# Patient Record
Sex: MALE | Race: WHITE | Employment: UNEMPLOYED | URBAN - METROPOLITAN AREA
[De-identification: names, ages, dates, MRNs, and addresses within clinical notes are randomized per-mention and may not be internally consistent; named-entity substitution may affect disease eponyms.]

---

## 2023-05-12 ENCOUNTER — APPOINTMENT (OUTPATIENT)
Dept: GENERAL RADIOLOGY | Age: 43
End: 2023-05-12
Attending: EMERGENCY MEDICINE
Payer: MEDICAID

## 2023-05-12 ENCOUNTER — HOSPITAL ENCOUNTER (EMERGENCY)
Age: 43
Discharge: HOME | End: 2023-05-12
Attending: EMERGENCY MEDICINE
Payer: MEDICAID

## 2023-05-12 VITALS
SYSTOLIC BLOOD PRESSURE: 134 MMHG | HEIGHT: 67 IN | HEART RATE: 91 BPM | BODY MASS INDEX: 32.96 KG/M2 | OXYGEN SATURATION: 98 % | WEIGHT: 210 LBS | DIASTOLIC BLOOD PRESSURE: 87 MMHG | RESPIRATION RATE: 20 BRPM

## 2023-05-12 DIAGNOSIS — R07.9 CHEST PAIN OF UNCERTAIN ETIOLOGY: Primary | ICD-10-CM

## 2023-05-12 LAB
ALBUMIN SERPL-MCNC: 4.5 G/DL (ref 3.4–5)
ALBUMIN/GLOB SERPL: 1.6 {RATIO} (ref 1–2)
ALP LIVER SERPL-CCNC: 71 U/L
ALT SERPL-CCNC: 40 U/L
ANION GAP SERPL CALC-SCNC: 12 MMOL/L (ref 0–18)
AST SERPL-CCNC: 33 U/L (ref 15–37)
ATRIAL RATE: 122 BPM
BASOPHILS # BLD AUTO: 0.04 X10(3) UL (ref 0–0.2)
BASOPHILS NFR BLD AUTO: 0.3 %
BILIRUB SERPL-MCNC: 0.6 MG/DL (ref 0.1–2)
BUN BLD-MCNC: 10 MG/DL (ref 7–18)
CALCIUM BLD-MCNC: 9.3 MG/DL (ref 8.5–10.1)
CHLORIDE SERPL-SCNC: 93 MMOL/L (ref 98–112)
CO2 SERPL-SCNC: 22 MMOL/L (ref 21–32)
CREAT BLD-MCNC: 1.06 MG/DL
D DIMER PPP FEU-MCNC: <0.27 UG/ML FEU (ref ?–0.5)
EOSINOPHIL # BLD AUTO: 0.06 X10(3) UL (ref 0–0.7)
EOSINOPHIL NFR BLD AUTO: 0.4 %
ERYTHROCYTE [DISTWIDTH] IN BLOOD BY AUTOMATED COUNT: 17.8 %
GFR SERPLBLD BASED ON 1.73 SQ M-ARVRAT: 90 ML/MIN/1.73M2 (ref 60–?)
GLOBULIN PLAS-MCNC: 2.9 G/DL (ref 2.8–4.4)
GLUCOSE BLD-MCNC: 123 MG/DL (ref 70–99)
HCT VFR BLD AUTO: 38.8 %
HGB BLD-MCNC: 12.7 G/DL
IMM GRANULOCYTES # BLD AUTO: 0.06 X10(3) UL (ref 0–1)
IMM GRANULOCYTES NFR BLD: 0.4 %
LYMPHOCYTES # BLD AUTO: 2.07 X10(3) UL (ref 1–4)
LYMPHOCYTES NFR BLD AUTO: 13.3 %
MCH RBC QN AUTO: 20.5 PG (ref 26–34)
MCHC RBC AUTO-ENTMCNC: 32.7 G/DL (ref 31–37)
MCV RBC AUTO: 62.7 FL
MONOCYTES # BLD AUTO: 1.26 X10(3) UL (ref 0.1–1)
MONOCYTES NFR BLD AUTO: 8.1 %
NEUTROPHILS # BLD AUTO: 12.03 X10 (3) UL (ref 1.5–7.7)
NEUTROPHILS # BLD AUTO: 12.03 X10(3) UL (ref 1.5–7.7)
NEUTROPHILS NFR BLD AUTO: 77.5 %
OSMOLALITY SERPL CALC.SUM OF ELEC: 264 MOSM/KG (ref 275–295)
P AXIS: 56 DEGREES
P-R INTERVAL: 152 MS
PLATELET # BLD AUTO: 325 10(3)UL (ref 150–450)
POTASSIUM SERPL-SCNC: 2.9 MMOL/L (ref 3.5–5.1)
PROT SERPL-MCNC: 7.4 G/DL (ref 6.4–8.2)
Q-T INTERVAL: 324 MS
QRS DURATION: 94 MS
QTC CALCULATION (BEZET): 461 MS
R AXIS: 24 DEGREES
RBC # BLD AUTO: 6.19 X10(6)UL
SODIUM SERPL-SCNC: 127 MMOL/L (ref 136–145)
T AXIS: 28 DEGREES
TROPONIN I HIGH SENSITIVITY: 9 NG/L
TROPONIN I HIGH SENSITIVITY: 9 NG/L
VENTRICULAR RATE: 122 BPM
WBC # BLD AUTO: 15.5 X10(3) UL (ref 4–11)

## 2023-05-12 PROCEDURE — 96374 THER/PROPH/DIAG INJ IV PUSH: CPT

## 2023-05-12 PROCEDURE — 85379 FIBRIN DEGRADATION QUANT: CPT | Performed by: EMERGENCY MEDICINE

## 2023-05-12 PROCEDURE — 85025 COMPLETE CBC W/AUTO DIFF WBC: CPT | Performed by: EMERGENCY MEDICINE

## 2023-05-12 PROCEDURE — 99285 EMERGENCY DEPT VISIT HI MDM: CPT

## 2023-05-12 PROCEDURE — 96361 HYDRATE IV INFUSION ADD-ON: CPT

## 2023-05-12 PROCEDURE — 80053 COMPREHEN METABOLIC PANEL: CPT | Performed by: EMERGENCY MEDICINE

## 2023-05-12 PROCEDURE — 93010 ELECTROCARDIOGRAM REPORT: CPT

## 2023-05-12 PROCEDURE — 84484 ASSAY OF TROPONIN QUANT: CPT | Performed by: EMERGENCY MEDICINE

## 2023-05-12 PROCEDURE — 93005 ELECTROCARDIOGRAM TRACING: CPT

## 2023-05-12 PROCEDURE — 71045 X-RAY EXAM CHEST 1 VIEW: CPT | Performed by: EMERGENCY MEDICINE

## 2023-05-12 RX ORDER — LIDOCAINE HYDROCHLORIDE 20 MG/ML
10 SOLUTION OROPHARYNGEAL ONCE
Status: COMPLETED | OUTPATIENT
Start: 2023-05-12 | End: 2023-05-12

## 2023-05-12 RX ORDER — POTASSIUM CHLORIDE 20 MEQ/1
40 TABLET, EXTENDED RELEASE ORAL ONCE
Status: COMPLETED | OUTPATIENT
Start: 2023-05-12 | End: 2023-05-12

## 2023-05-12 RX ORDER — MAGNESIUM HYDROXIDE/ALUMINUM HYDROXICE/SIMETHICONE 120; 1200; 1200 MG/30ML; MG/30ML; MG/30ML
30 SUSPENSION ORAL ONCE
Status: COMPLETED | OUTPATIENT
Start: 2023-05-12 | End: 2023-05-12

## 2023-05-12 RX ORDER — LORAZEPAM 2 MG/ML
1 INJECTION INTRAMUSCULAR ONCE
Status: COMPLETED | OUTPATIENT
Start: 2023-05-12 | End: 2023-05-12

## 2023-05-12 NOTE — ED INITIAL ASSESSMENT (HPI)
PT to the ED for evaluation of chest pain for the last hour and SOB. PT reports taking several doses of OTC ED medications since 1700. PT took a \"honey packet\" at 1700, and a \"blue chew\" at 1800, 2100, and 0100. PT states these are \"viagra pills. \"

## 2023-05-12 NOTE — CM/SW NOTE
1:  Called by Dr. Rose Timmons to schedule patient for expedited Stress Echo. Per Dr. Rose Timmons patient requesting Friday only. ERCM informed Dr. Rose Timmons to please inform patient we have to be flexible and take first available date and time. Dr. Rose Timmons v/u and will inform patient of this. Dr. Rose Timmons would like patient to F/U with cardiology - ERCM advised Dr. Rose Timmons to carbon copy cardiology when ordering stress echo as pt does not have PCP listed in [facility]. Dr. Rose Timmons v/u.    6498:  [facility] scheduled stress echo for 5/17 @ 245PM at [facility]. ERCM called patient to inform him of the above date, time and location and below stress echo instructions:  [id number] CARD STRESS ECHO  Wednesday May 17 2:45 PM  Please wear comfortable shoes for walking, slacks and top with no metal snaps, zippers or decoration. Do not wear a dress. You may have a light meal, such as toast or crackers and juice or water. This exam could require an I.V., please drink plenty of fluid prior to the test in order to obtain I.V. access. Please restrict all caffeine and nicotine 12 hours prior to your test (including chocolate, Excedrin, tea, or decaffeinated coffee). Please note if you consume ANY AMOUNT OF CAFFEINE, YOUR TEST MAY BE CANCELLED. Medications should be taken as directed. [facility] Echocardiography  [facility] 14  [phone]   However patient was in bathroom per JEMMA El RN. Dwaine EASTON ER RN informed to please call ERCM back on [employer] phone once patient back from bathroom so ERCM can inform patient of the above information. Jama MALDONADO RN v/u.  [id number]:  JEMMA El RN calling back via [employer] phone. JOEL El RN provided patient with [employer] - ERCM informed patient of date, time, location and of all of the above stress test instructions. Patient v/u.  Patient also informed all of the above information will print out on his AVS for reference. Pt v/u. JEMMA El RN also informed of all of the above and advised JEMMA El RN to please briefly review the above information on pt's AVS when she provides patient with AVS on discharge.  JEMMA El RN v/u.